# Patient Record
Sex: FEMALE | Race: WHITE
[De-identification: names, ages, dates, MRNs, and addresses within clinical notes are randomized per-mention and may not be internally consistent; named-entity substitution may affect disease eponyms.]

---

## 2017-08-12 ENCOUNTER — HEALTH MAINTENANCE LETTER (OUTPATIENT)
Age: 57
End: 2017-08-12

## 2017-11-07 ENCOUNTER — HOSPITAL ENCOUNTER (EMERGENCY)
Facility: CLINIC | Age: 57
Discharge: HOME | End: 2017-11-07
Attending: EMERGENCY MEDICINE | Admitting: EMERGENCY MEDICINE
Payer: COMMERCIAL

## 2017-11-07 ENCOUNTER — APPOINTMENT (OUTPATIENT)
Dept: GENERAL RADIOLOGY | Facility: CLINIC | Age: 57
End: 2017-11-07
Attending: EMERGENCY MEDICINE
Payer: COMMERCIAL

## 2017-11-07 VITALS
SYSTOLIC BLOOD PRESSURE: 129 MMHG | HEART RATE: 64 BPM | RESPIRATION RATE: 14 BRPM | OXYGEN SATURATION: 99 % | WEIGHT: 183 LBS | DIASTOLIC BLOOD PRESSURE: 88 MMHG | TEMPERATURE: 98.3 F

## 2017-11-07 DIAGNOSIS — S00.33XA CONTUSION OF NOSE, INITIAL ENCOUNTER: ICD-10-CM
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
## 2017-11-07 DIAGNOSIS — N30.01 ACUTE CYSTITIS WITH HEMATURIA: ICD-10-CM

## 2017-11-07 LAB
ALBUMIN UR-MCNC: 10 MG/DL
APPEARANCE UR: ABNORMAL
BACTERIA #/AREA URNS HPF: ABNORMAL /HPF
BILIRUB UR QL STRIP: NEGATIVE
COLOR UR AUTO: ABNORMAL
GLUCOSE UR STRIP-MCNC: NEGATIVE MG/DL
HGB UR QL STRIP: ABNORMAL
KETONES UR STRIP-MCNC: NEGATIVE MG/DL
LEUKOCYTE ESTERASE UR QL STRIP: ABNORMAL
NITRATE UR QL: NEGATIVE
PH UR STRIP: 6.5 PH (ref 5–7)
RBC #/AREA URNS AUTO: 11 /HPF (ref 0–2)
SOURCE: ABNORMAL
SP GR UR STRIP: 1.01 (ref 1–1.03)
SQUAMOUS #/AREA URNS AUTO: 1 /HPF (ref 0–1)
UROBILINOGEN UR STRIP-MCNC: NORMAL MG/DL (ref 0–2)
WBC #/AREA URNS AUTO: 169 /HPF (ref 0–2)

## 2017-11-07 PROCEDURE — 99284 EMERGENCY DEPT VISIT MOD MDM: CPT | Mod: Z6 | Performed by: EMERGENCY MEDICINE

## 2017-11-07 PROCEDURE — 99283 EMERGENCY DEPT VISIT LOW MDM: CPT | Performed by: EMERGENCY MEDICINE

## 2017-11-07 PROCEDURE — 81001 URINALYSIS AUTO W/SCOPE: CPT | Performed by: EMERGENCY MEDICINE

## 2017-11-07 PROCEDURE — 87086 URINE CULTURE/COLONY COUNT: CPT | Performed by: EMERGENCY MEDICINE

## 2017-11-07 PROCEDURE — 70150 X-RAY EXAM OF FACIAL BONES: CPT

## 2017-11-07 RX ORDER — PHENAZOPYRIDINE HYDROCHLORIDE 200 MG/1
200 TABLET, FILM COATED ORAL 3 TIMES DAILY
Qty: 9 TABLET | Refills: 0 | Status: SHIPPED | OUTPATIENT
Start: 2017-11-07 | End: 2017-11-10

## 2017-11-07 RX ORDER — CEFDINIR 300 MG/1
300 CAPSULE ORAL 2 TIMES DAILY
Qty: 20 CAPSULE | Refills: 0 | Status: SHIPPED | OUTPATIENT
Start: 2017-11-07

## 2017-11-07 ASSESSMENT — ENCOUNTER SYMPTOMS
NAUSEA: 0
NUMBNESS: 1
VOMITING: 0
DYSURIA: 1
FLANK PAIN: 0
HEMATURIA: 1

## 2017-11-07 NOTE — ED AVS SNAPSHOT
[facility], [place], Emergency Department    [address]    [facility] [address]    Phone:  [phone]    Fax:  [phone]                                       Sosa Tellez   MRN: [id number]    Department:  [facility], Emergency Department   Date of Visit:  11/7/2017           After Visit Summary Signature Page     I have received my discharge instructions, and my questions have been answered. I have discussed any challenges I see with this plan with the nurse or doctor.    ..........................................................................................................................................  Patient/Patient Representative Signature      ..........................................................................................................................................  Patient Representative Print Name and Relationship to Patient    ..................................................               ................................................  Date                                            Time    ..........................................................................................................................................  Reviewed by Signature/Title    ...................................................              ..............................................  Date                                                            Time
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
## 2017-11-07 NOTE — DISCHARGE INSTRUCTIONS
Please make an appointment to follow up with Your Primary Care Provider in 7-10 days as needed.    Take cefdinir to treat UTI.  Take pyridium as needed for discomfort with urinating.  This will turn your urine orange.  If you have flank pain, fevers, intractable vomiting, return to the emergency department.     Use ice packs, acetaminophen or ibuprofen as needed for facial discomfort.  If you have worsening pain, vision changes, swelling from nose, intractable vomiting or severe headache, return to the emergency department for re-evaluation.            Nasal Contusion  Your nose has bruising (contusion). You don t appear to have any broken bones. A contusion may cause pain, swelling, and stuffiness of the nose. You may also have bleeding.  Home care    To ease pain and swelling, wrap a bag of ice, cold pack, or frozen peas in a thin towel. Place the cold source on your nose for 10 minutes at a time. Do this every 2 hours during the first 24 hours. Then continue 4 times a day for the next 2 days.    Take pain medicines as directed. Talk with your healthcare provider before taking ibuprofen to help control pain.    Tell the healthcare provider if you are taking aspirin or blood thinners.    Don't blow your nose for the first 2 days. After this, blow your nose gently. This helps prevent new bleeding.    Don t drink alcohol or hot liquids for the next 2 days. These can dilate blood vessels in your nose and cause bleeding.    Sleep with your head elevated for a couple of days until the swelling and pain being to lessen.    Avoid any activity that could result in another head injury until you are given the OK to do so.   Note about concussion  Because the injury was to your head, it is possible that you could have a concussion (mild brain injury). Symptoms of concussion can show up later. For this reason, be alert for signs and symptoms of a concussion. Seek emergency medical care if any of these develop over the next  "hours to days:    Headache    Nausea or vomiting    Dizziness    Sensitivity to light or noise    Unusual sleepiness or grogginess    Trouble falling asleep    Personality changes    Vision changes    Memory loss    Confusion    Trouble walking or clumsiness    Loss of consciousness (even for a short time)    Inability to be awakened   Follow-up care  Follow up with your doctor, or as advised. If you have been referred to a specialist, make an appointment within 3-5 days of the injury.  When to seek medical advice  Call your healthcare provider right away if any of these occur:    Bleeding from your nose that won't stop    Your nose looks crooked    You cannot breathe through one or both sides of your nose    Facial swelling, pain, or redness that gets worse    Fever of 100.4 F (38 C)    Pus or clear discharge from your nose    Skin on the nose is split open or has a gap    Sinus pain  Date Last Reviewed: 4/13/2015 2000-2017 [employer]. [address] [place] [address]. All rights reserved. This information is not intended as a substitute for professional medical care. Always follow your healthcare professional's instructions.    * BLADDER INFECTION,Female (Adult)       A bladder infection (\"cystitis\" or \"UTI\") usually causes a constant urge to urinate and a burning when passing urine. Urine may be cloudy, smelly or dark. There may be pain in the lower abdomen. A bladder infection occurs when bacteria from the vaginal area enter the bladder opening (urethra). This can occur from sexual intercourse, wearing tight clothing, dehydration and other factors.  HOME CARE:  1. Drink lots of fluids (at least 6-8 glasses a day, unless you must restrict fluids for other medical reasons). This will force the medicine into your urinary system and flush the bacteria out of your body. Cranberry juice has been shown to help clear out the bacteria.  2. Avoid sexual intercourse until your symptoms are " gone.  3. A bladder infection is treated with antibiotics. You may also be given Pyridium (generic = phenazopyridine) to reduce the burning sensation. This medicine will cause your urine to become a bright orange color. The orange urine may stain clothing. You may wear a pad or panty-liner to protect clothing.  PREVENTING FUTURE INFECTIONS:  1. Always wipe from front to back after a bowel movement.  2. Keep the genital area clean and dry.  3. Drink plenty of fluids each day to avoid dehydration.  4. Urinate right after intercourse to flush out the bladder.  5. Wear cotton underwear and cotton-lined panty hose; avoid tight-fitting pants.  6. If you are on birth control pills and are having frequent bladder infections, discuss with your doctor.  FOLLOW UP: Return to this facility or see your doctor if ALL symptoms are not gone after three days of treatment.  GET PROMPT MEDICAL ATTENTION if any of the following occur:    Fever over 101 F (38.3 C)    No improvement by the third day of treatment    Increasing back or abdominal pain    Repeated vomiting; unable to keep medicine down    Weakness, dizziness or fainting    Vaginal discharge    Pain, redness or swelling in the labia (outer vaginal area)    8062-0978 [employer] [address] [place] [address], [place] [address]. All rights reserved. This information is not intended as a substitute for professional medical care. Always follow your healthcare professional's instructions.  This information has been modified by your health care provider with permission from the publisher.
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
## 2017-11-08 LAB
BACTERIA SPEC CULT: NORMAL
SPECIMEN SOURCE: NORMAL

## 2018-08-19 ENCOUNTER — HEALTH MAINTENANCE LETTER (OUTPATIENT)
Age: 58
End: 2018-08-19

## 2020-05-13 ENCOUNTER — AMBULATORY - HEALTHEAST (OUTPATIENT)
Dept: MULTI SPECIALTY CLINIC | Facility: CLINIC | Age: 60
End: 2020-05-13

## 2020-05-13 LAB
CREAT SERPL-MCNC: 0.88 MG/DL (ref 0.51–0.95)
GFR ESTIMATE EXT - HISTORICAL: >60 ML/MIN/1.73M2 (ref 60–150)

## 2020-05-22 ENCOUNTER — AMBULATORY - HEALTHEAST (OUTPATIENT)
Dept: SURGERY | Facility: CLINIC | Age: 60
End: 2020-05-22

## 2020-05-22 ENCOUNTER — ANESTHESIA - HEALTHEAST (OUTPATIENT)
Dept: SURGERY | Facility: CLINIC | Age: 60
End: 2020-05-22

## 2020-05-22 DIAGNOSIS — Z11.59 ENCOUNTER FOR SCREENING FOR OTHER VIRAL DISEASES: ICD-10-CM

## 2020-05-23 ENCOUNTER — AMBULATORY - HEALTHEAST (OUTPATIENT)
Dept: FAMILY MEDICINE | Facility: CLINIC | Age: 60
End: 2020-05-23

## 2020-05-23 DIAGNOSIS — Z11.59 ENCOUNTER FOR SCREENING FOR OTHER VIRAL DISEASES: ICD-10-CM

## 2020-05-26 ENCOUNTER — SURGERY - HEALTHEAST (OUTPATIENT)
Dept: SURGERY | Facility: CLINIC | Age: 60
End: 2020-05-26

## 2020-05-26 ASSESSMENT — MIFFLIN-ST. JEOR: SCORE: 1359.65

## 2021-06-04 VITALS — BODY MASS INDEX: 29.52 KG/M2 | HEIGHT: 65 IN | WEIGHT: 177.2 LBS

## 2021-06-08 NOTE — ANESTHESIA PREPROCEDURE EVALUATION
Anesthesia Evaluation      Patient summary reviewed   History of anesthetic complications (History of  X 2)     Airway   Mallampati: III  Neck ROM: full   Pulmonary - normal exam   (+) COPD moderate, shortness of breath (stable),                          Cardiovascular - normal exam  Exercise tolerance: > or = 4 METS  ECG reviewed        Neuro/Psych    (+) CVA (denies residual symptoms) , depression, anxiety/panic attacks,     Endo/Other    (+) hypothyroidism, obesity,      GI/Hepatic/Renal - negative ROS           Dental    (+) bridge                       Anesthesia Plan  Planned anesthetic: general endotracheal, peripheral nerve block and total IV anesthesia   Precautions.    ASA 2   Induction: intravenous   Anesthetic plan and risks discussed with: patient  Anesthesia plan special considerations: antiemetics,   Post-op plan: routine recovery

## 2021-06-08 NOTE — ANESTHESIA CARE TRANSFER NOTE
Last vitals:   Vitals:    05/26/20 0957   BP: 155/70   Pulse: 70   Resp: 16   Temp: 35.9  C (96.7  F)   SpO2: 100%     Patient's level of consciousness is awake  Spontaneous respirations: yes  Maintains airway independently: yes  Dentition unchanged: yes  Oropharynx: oropharynx clear of all foreign objects    QCDR Measures:  ASA# 20 - Surgical Safety Checklist: WHO surgical safety checklist completed prior to induction    PQRS# 430 - Adult PONV Prevention: 4558F - Pt received => 2 anti-emetic agents (different classes) preop & intraop  ASA# 8 - Peds PONV Prevention: NA - Not pediatric patient, not GA or 2 or more risk factors NOT present  PQRS# 424 - [place]-op Temp Management: 4559F - At least one body temp DOCUMENTED => 35.5C or 95.9F within required timeframe  PQRS# 426 - PACU Transfer Protocol: - Transfer of care checklist used  ASA# 14 - Acute Post-op Pain: ASA14B - Patient did NOT experience pain >= 7 out of 10

## 2021-06-08 NOTE — ANESTHESIA POSTPROCEDURE EVALUATION
Patient: Sosa Tellez  Procedure(s):  MINI OPEN ROTATOR CUFF REPAIR (Left)  LEFT SHOULDER ARTHROSCOPY, SUBACROMIAL DECOMPRESSION AND  DISTAL CLAVICLE EXCISION (Left)  Anesthesia type: general    Patient location: PACU  Last vitals:   Vitals Value Taken Time   /71 5/26/2020 10:20 AM   Temp 35.9  C (96.7  F) 5/26/2020  9:57 AM   Pulse 64 5/26/2020 10:22 AM   Resp 8 5/26/2020 10:22 AM   SpO2 92 % 5/26/2020 10:22 AM   Vitals shown include unvalidated device data.  Post vital signs: stable  Level of consciousness: awake and responds to simple questions  Post-anesthesia pain: pain controlled  Post-anesthesia nausea and vomiting: no  Pulmonary: unassisted, nasal cannula  Cardiovascular: stable and blood pressure at baseline  Hydration: adequate  Anesthetic events: no    QCDR Measures:  ASA# 11 - [place]-op Cardiac Arrest: ASA11B - Patient did NOT experience unanticipated cardiac arrest  ASA# 12 - [place]-op Mortality Rate: ASA12B - Patient did NOT die  ASA# 13 - PACU Re-Intubation Rate: ASA13B - Patient did NOT require a new airway mgmt  ASA# 10 - Composite Anes Safety: ASA10A - No serious adverse event    Additional Notes:

## 2021-06-08 NOTE — ANESTHESIA PROCEDURE NOTES
Peripheral Block    Patient location during procedure: pre-op  Start time: 5/26/2020 7:18 AM  End time: 5/26/2020 7:22 AM    Staffing:  Performing  Anesthesiologist: Manny Oviedo MD  Preanesthetic Checklist  Completed: patient identified, site marked, risks, benefits, and alternatives discussed, timeout performed, consent obtained, at patient's request, airway assessed, oxygen available, suction available, emergency drugs available and hand hygiene performed  Peripheral Block  Block type: brachial plexus, supraclavicular  Prep: ChloraPrep  Patient position: supine  Patient monitoring: blood pressure, heart rate, continuous pulse oximetry and cardiac monitor  Laterality: left  Injection technique: ultrasound guided    Ultrasound used to visualize needle placement in proximity to nerve being blocked: yes   US used to visualize anesthetic spread  Visualized anatomic structures normal  No Pathological Findings  Permanent ultrasound image captured for medical record  Sterile gel and probe cover used for ultrasound.  Needle  Needle type: Stimuplex   Needle gauge: 21 G  Needle length: 4 in  no peripheral nerve catheter placed  Assessment  Injection assessment: no difficulty with injection, negative aspiration for heme, no paresthesia on injection and incremental injection

## 2021-11-14 ENCOUNTER — HEALTH MAINTENANCE LETTER (OUTPATIENT)
Age: 61
End: 2021-11-14

## 2022-11-21 ENCOUNTER — HEALTH MAINTENANCE LETTER (OUTPATIENT)
Age: 62
End: 2022-11-21

## 2023-04-16 ENCOUNTER — HEALTH MAINTENANCE LETTER (OUTPATIENT)
Age: 63
End: 2023-04-16

## 2023-06-02 ENCOUNTER — HEALTH MAINTENANCE LETTER (OUTPATIENT)
Age: 63
End: 2023-06-02

## 2023-11-25 ENCOUNTER — HEALTH MAINTENANCE LETTER (OUTPATIENT)
Age: 63
End: 2023-11-25